# Patient Record
Sex: FEMALE | Race: WHITE | NOT HISPANIC OR LATINO | Employment: OTHER | URBAN - METROPOLITAN AREA
[De-identification: names, ages, dates, MRNs, and addresses within clinical notes are randomized per-mention and may not be internally consistent; named-entity substitution may affect disease eponyms.]

---

## 2022-01-14 NOTE — TELEPHONE ENCOUNTER
----- Message from Paulino Singh sent at 1/14/2022  2:16 PM CST -----  Contact: Patient  Patient asking for a refill to be called in      Rx- [facility]  [address]      Call back #   [phone]

## 2022-01-17 RX ORDER — DIPHENOXYLATE HYDROCHLORIDE AND ATROPINE SULFATE 2.5; .025 MG/1; MG/1
1 TABLET ORAL 4 TIMES DAILY PRN
Qty: 100 TABLET | Refills: 3 | Status: SHIPPED | OUTPATIENT
Start: 2022-01-17 | End: 2022-07-27 | Stop reason: SDUPTHER

## 2022-07-27 ENCOUNTER — OFFICE VISIT (OUTPATIENT)
Dept: GASTROENTEROLOGY | Facility: CLINIC | Age: 72
End: 2022-07-27
Payer: MEDICARE

## 2022-07-27 VITALS
HEIGHT: 66 IN | HEART RATE: 70 BPM | WEIGHT: 218.63 LBS | SYSTOLIC BLOOD PRESSURE: 132 MMHG | DIASTOLIC BLOOD PRESSURE: 85 MMHG | BODY MASS INDEX: 35.14 KG/M2

## 2022-07-27 DIAGNOSIS — Z86.010 HISTORY OF COLON POLYPS: ICD-10-CM

## 2022-07-27 DIAGNOSIS — K58.0 IRRITABLE BOWEL SYNDROME WITH DIARRHEA: Primary | ICD-10-CM

## 2022-07-27 PROCEDURE — 1159F PR MEDICATION LIST DOCUMENTED IN MEDICAL RECORD: ICD-10-PCS | Mod: CPTII,S$GLB,, | Performed by: INTERNAL MEDICINE

## 2022-07-27 PROCEDURE — 1159F MED LIST DOCD IN RCRD: CPT | Mod: CPTII,S$GLB,, | Performed by: INTERNAL MEDICINE

## 2022-07-27 PROCEDURE — 99213 OFFICE O/P EST LOW 20 MIN: CPT | Mod: S$GLB,,, | Performed by: INTERNAL MEDICINE

## 2022-07-27 PROCEDURE — 3008F BODY MASS INDEX DOCD: CPT | Mod: CPTII,S$GLB,, | Performed by: INTERNAL MEDICINE

## 2022-07-27 PROCEDURE — 3075F PR MOST RECENT SYSTOLIC BLOOD PRESS GE 130-139MM HG: ICD-10-PCS | Mod: CPTII,S$GLB,, | Performed by: INTERNAL MEDICINE

## 2022-07-27 PROCEDURE — 3079F DIAST BP 80-89 MM HG: CPT | Mod: CPTII,S$GLB,, | Performed by: INTERNAL MEDICINE

## 2022-07-27 PROCEDURE — 3008F PR BODY MASS INDEX (BMI) DOCUMENTED: ICD-10-PCS | Mod: CPTII,S$GLB,, | Performed by: INTERNAL MEDICINE

## 2022-07-27 PROCEDURE — 1101F PR PT FALLS ASSESS DOC 0-1 FALLS W/OUT INJ PAST YR: ICD-10-PCS | Mod: CPTII,S$GLB,, | Performed by: INTERNAL MEDICINE

## 2022-07-27 PROCEDURE — 3288F FALL RISK ASSESSMENT DOCD: CPT | Mod: CPTII,S$GLB,, | Performed by: INTERNAL MEDICINE

## 2022-07-27 PROCEDURE — 3288F PR FALLS RISK ASSESSMENT DOCUMENTED: ICD-10-PCS | Mod: CPTII,S$GLB,, | Performed by: INTERNAL MEDICINE

## 2022-07-27 PROCEDURE — 99213 PR OFFICE/OUTPT VISIT, EST, LEVL III, 20-29 MIN: ICD-10-PCS | Mod: S$GLB,,, | Performed by: INTERNAL MEDICINE

## 2022-07-27 PROCEDURE — 3079F PR MOST RECENT DIASTOLIC BLOOD PRESSURE 80-89 MM HG: ICD-10-PCS | Mod: CPTII,S$GLB,, | Performed by: INTERNAL MEDICINE

## 2022-07-27 PROCEDURE — 1160F PR REVIEW ALL MEDS BY PRESCRIBER/CLIN PHARMACIST DOCUMENTED: ICD-10-PCS | Mod: CPTII,S$GLB,, | Performed by: INTERNAL MEDICINE

## 2022-07-27 PROCEDURE — 1101F PT FALLS ASSESS-DOCD LE1/YR: CPT | Mod: CPTII,S$GLB,, | Performed by: INTERNAL MEDICINE

## 2022-07-27 PROCEDURE — 1160F RVW MEDS BY RX/DR IN RCRD: CPT | Mod: CPTII,S$GLB,, | Performed by: INTERNAL MEDICINE

## 2022-07-27 PROCEDURE — 3075F SYST BP GE 130 - 139MM HG: CPT | Mod: CPTII,S$GLB,, | Performed by: INTERNAL MEDICINE

## 2022-07-27 RX ORDER — ROSUVASTATIN CALCIUM 10 MG/1
10 TABLET, COATED ORAL DAILY
COMMUNITY
Start: 2022-07-01

## 2022-07-27 RX ORDER — TRAZODONE HYDROCHLORIDE 100 MG/1
100 TABLET ORAL NIGHTLY
COMMUNITY
Start: 2022-06-28

## 2022-07-27 RX ORDER — DIPHENOXYLATE HYDROCHLORIDE AND ATROPINE SULFATE 2.5; .025 MG/1; MG/1
1 TABLET ORAL 4 TIMES DAILY PRN
Qty: 100 TABLET | Refills: 3 | Status: SHIPPED | OUTPATIENT
Start: 2022-07-27 | End: 2024-01-04 | Stop reason: SDUPTHER

## 2022-07-27 RX ORDER — CLONAZEPAM 1 MG/1
1 TABLET ORAL NIGHTLY
COMMUNITY
Start: 2022-07-08

## 2022-07-27 RX ORDER — PRASTERONE 6.5 MG/1
1 INSERT VAGINAL
COMMUNITY
Start: 2022-06-28

## 2022-07-27 RX ORDER — LEVOTHYROXINE, LIOTHYRONINE 76; 18 UG/1; UG/1
120 TABLET ORAL DAILY
COMMUNITY
Start: 2022-07-01

## 2022-07-27 RX ORDER — DULOXETIN HYDROCHLORIDE 30 MG/1
30 CAPSULE, DELAYED RELEASE ORAL DAILY
COMMUNITY
Start: 2022-07-01

## 2022-07-27 RX ORDER — NEBIVOLOL 5 MG/1
5 TABLET ORAL DAILY
COMMUNITY
Start: 2022-07-01

## 2022-07-27 NOTE — PROGRESS NOTES
Clinic Note    Reason for visit:  The primary encounter diagnosis was Irritable bowel syndrome with diarrhea. A diagnosis of History of colon polyps was also pertinent to this visit.    PCP: Elysia [facility]   [address] [facility] [address] [place] [address]    HPI:  This is a 72 y.o. female who is being seen for a follow up visit. Patient with IBS-D. She reports doing much better. She has about 2 bad days a month. She states BMs at least twice a day which is improved. On bad days, may have 4-5 BMs daily. She takes Lomotil prn. Usually only if going out to eat or on a trip. She reports having lost a little weight and feels that may have helped as well.     Last colonoscopy 12/7/2020: 2 polyps: 1 TA, repeat colon in 3y    Chronic diarrhea: likely IBS-D, DBx mild IEL, Celiac serologies neg, gluten-sensitive, CBx nl, Bentyl 20mg TID PRN. Welchol 2 times a day and dietary restrictions helped. Colestipol BID helped better with PRN Bentyl. PRN Lomotil helped within 3 days. Rifaximin was cost prohibitive ($1000). Overall much improved.       Irritable bowel syndrome with diarrhea       Personal history of colon polyps: due 12/2023    Review of Systems   Constitutional: Positive for fatigue. Negative for chills, diaphoresis, fever and unexpected weight change.   HENT: Negative for mouth sores, nosebleeds, postnasal drip, sore throat, trouble swallowing and voice change.    Eyes: Positive for eye dryness. Negative for pain and discharge.   Respiratory: Negative for apnea, cough, choking, chest tightness, shortness of breath and wheezing.    Cardiovascular: Negative for chest pain, palpitations, leg swelling and claudication.   Gastrointestinal: Positive for abdominal pain and diarrhea. Negative for abdominal distention, anal bleeding, blood in stool, change in bowel habit, constipation, nausea, rectal pain, vomiting, reflux, fecal incontinence and change in bowel habit.   Genitourinary: Negative for bladder incontinence, difficulty  urinating, dysuria, flank pain, frequency and hematuria.   Musculoskeletal: Positive for joint swelling. Negative for arthralgias, back pain and joint deformity.   Integumentary:  Negative for color change, rash and wound.   Allergic/Immunologic: Negative for environmental allergies and food allergies.   Neurological: Negative for seizures, facial asymmetry, speech difficulty, weakness, headaches and memory loss.   Hematological: Negative for adenopathy. Does not bruise/bleed easily.   Psychiatric/Behavioral: Negative for agitation, behavioral problems, confusion, hallucinations and sleep disturbance.      Past Medical History:   Diagnosis Date    Diarrhea     Hyperlipidemia .    Hypertension     Insomnia     Irritable bowel syndrome with diarrhea     Obesity, Class II, BMI 35-39.9, isolated (see actual BMI)     Sleep apnea      Past Surgical History:   Procedure Laterality Date    bladder lift      CHOLECYSTECTOMY      COLONOSCOPY      HYSTERECTOMY      rectocele surgery      TOTAL KNEE ARTHROPLASTY       History reviewed. No pertinent family history.  Social History     Tobacco Use    Smoking status: Never Smoker    Smokeless tobacco: Never Used   Substance Use Topics    Alcohol use: Not Currently     Review of patient's allergies indicates:   Allergen Reactions    Opioids - morphine analogues Nausea And Vomiting        Medication List with Changes/Refills   Current Medications    CLONAZEPAM (KLONOPIN) 1 MG TABLET    Take 1 mg by mouth every evening.    DULOXETINE (CYMBALTA) 30 MG CAPSULE    Take 30 mg by mouth once daily.    INTRAROSA 6.5 MG INST    Place 1 tablet vaginally twice a week.    NEBIVOLOL (BYSTOLIC) 5 MG TAB    Take 5 mg by mouth once daily.    NP THYROID 120 MG TAB    Take 120 mg by mouth once daily.    ROSUVASTATIN (CRESTOR) 10 MG TABLET    Take 10 mg by mouth once daily.    TRAZODONE (DESYREL) 100 MG TABLET    Take 100 mg by mouth nightly.   Changed and/or Refilled Medications     "Modified Medication Previous Medication    DIPHENOXYLATE-ATROPINE 2.5-0.025 MG (LOMOTIL) 2.5-0.025 MG PER TABLET diphenoxylate-atropine 2.5-0.025 mg (LOMOTIL) 2.5-0.025 mg per tablet       Take 1 tablet by mouth 4 (four) times daily as needed for Diarrhea.    Take 1 tablet by mouth 4 (four) times daily as needed for Diarrhea.         Vital Signs:  /85 (BP Location: Right arm, Patient Position: Sitting, BP Method: Large (Automatic))   Pulse 70   Ht 5' 5.5" (1.664 m)   Wt 99.2 kg (218 lb 9.6 oz)   BMI 35.82 kg/m²         Physical Exam  Vitals reviewed.   Constitutional:       General: She is awake. She is not in acute distress.     Appearance: Normal appearance. She is well-developed. She is obese. She is not ill-appearing, toxic-appearing or diaphoretic.   HENT:      Head: Normocephalic and atraumatic.      Nose: Nose normal.      Mouth/Throat:      Mouth: Mucous membranes are moist.      Pharynx: Oropharynx is clear. No oropharyngeal exudate or posterior oropharyngeal erythema.   Eyes:      General: Lids are normal. Gaze aligned appropriately. No scleral icterus.        Right eye: No discharge.         Left eye: No discharge.      Extraocular Movements: Extraocular movements intact.      Conjunctiva/sclera: Conjunctivae normal.   Neck:      Trachea: Trachea normal.   Cardiovascular:      Rate and Rhythm: Normal rate and regular rhythm.      Pulses:           Radial pulses are 2+ on the right side and 2+ on the left side.   Pulmonary:      Effort: Pulmonary effort is normal. No respiratory distress.      Breath sounds: Normal breath sounds. No stridor. No wheezing or rhonchi.   Chest:      Chest wall: No tenderness.   Abdominal:      General: Bowel sounds are normal. There is no distension.      Palpations: Abdomen is soft. There is no fluid wave, hepatomegaly or mass.      Tenderness: There is no abdominal tenderness. There is no guarding or rebound.   Musculoskeletal:         General: No tenderness or " deformity.      Cervical back: Full passive range of motion without pain and neck supple. No tenderness.      Right lower leg: No edema.      Left lower leg: No edema.   Lymphadenopathy:      Cervical: No cervical adenopathy.   Skin:     General: Skin is warm and dry.      Capillary Refill: Capillary refill takes less than 2 seconds.      Coloration: Skin is not cyanotic, jaundiced or pale.      Findings: No rash.   Neurological:      General: No focal deficit present.      Mental Status: She is alert and oriented to person, place, and time.      Cranial Nerves: No facial asymmetry.      Motor: No tremor.   Psychiatric:         Attention and Perception: Attention normal.         Mood and Affect: Mood and affect normal.         Speech: Speech normal.         Behavior: Behavior normal. Behavior is cooperative.            All of the data above and below has been reviewed by myself and any further interpretations will be reflected in the assessment and plan.   The data includes review of external notes, and independent interpretation of lab results, procedures, x-rays, and imaging reports.      Assessment:  Irritable bowel syndrome with diarrhea    History of colon polyps    Other orders  -     diphenoxylate-atropine 2.5-0.025 mg (LOMOTIL) 2.5-0.025 mg per tablet; Take 1 tablet by mouth 4 (four) times daily as needed for Diarrhea.  Dispense: 100 tablet; Refill: 3    Chronic diarrhea: likely IBS-D, DBx mild IEL, Celiac serologies neg, gluten-sensitive, CBx nl. Doing well with Lomotil prn.     Personal history of colon polyps: due 12/2023     Recommendations:  Continue to use Lomotil as needed.    Risks, benefits, and alternatives of medical management, any associated procedures, and/or treatment discussed with the patient. Patient given opportunity to ask questions and voices understanding. Patient has elected to proceed with the recommended care modalities as discussed.    Follow up in about 1 year (around  7/27/2023).    Order summary:  No orders of the defined types were placed in this encounter.       Instructed patient to notify my office if they have not been contacted within two weeks after any procedures, submitting any samples (biopsies, blood, stool, urine, etc.) or after any imaging (X-ray, CT, MRI, etc.).     Zoila Amador MD    This document may have been created using a voice recognition transcribing system. Incorrect words or phrases may have been missed during proofreading. Please interpret accordingly or contact me for clarification.

## 2022-09-29 ENCOUNTER — HISTORICAL (OUTPATIENT)
Dept: ADMINISTRATIVE | Facility: HOSPITAL | Age: 72
End: 2022-09-29

## 2023-07-21 ENCOUNTER — TELEPHONE (OUTPATIENT)
Dept: GASTROENTEROLOGY | Facility: CLINIC | Age: 73
End: 2023-07-21
Payer: MEDICARE

## 2023-07-21 NOTE — TELEPHONE ENCOUNTER
----- Message from Damaris Garcia sent at 7/21/2023  3:21 PM CDT -----    ----- Message -----  From: Heather Felix  Sent: 7/21/2023   3:11 PM CDT  To: [facility] Staff    Type:  Needs Medical Advice    Who Called: Regina Souza  Symptoms (please be specific): -   How long has patient had these symptoms:  -  Pharmacy name and phone #:  -  Would the patient rather a call back or a response via [facility]?    Best Call Back Number: [phone]    Additional Information: pt needs  to cancel and reschedule upcoming appt ( 7/27/23) she just had knee replacement procedure please call

## 2023-07-21 NOTE — TELEPHONE ENCOUNTER
Returned pt call and she stated she just had knee replacement surgery and needs to reschedule her 1 yr f/u. I informed her it would need to be 6 mo out since she just had surgery. I rescheduled her OV for 1/4/24 with NP-KN. Pt acknowledge she understood. kenan

## 2024-01-03 NOTE — PROGRESS NOTES
Clinic Note    Reason for visit:  The primary encounter diagnosis was Irritable bowel syndrome with diarrhea. A diagnosis of History of colon polyps was also pertinent to this visit.    PCP: Elysia Manuel       HPI:  This is a 73 y.o. female here for follow up. Patient with IBS-D. Takes lomotil as needed. She was started on Cymbalta and states her IBS symptoms have improved significantly. May have to take lomotil 1-2 times a week. No abdominal pain, blood in the stool. Denies reflux, melena.    Last colonoscopy 12/7/2020: 2 polyps: 1 TA, repeat colon in 3y    12/2017 Colonoscopy with Dr. Allen: TC polyp s/p piecemeal polypectomy with ablation of residual tissue.  11/2017-Celiac negative  11/2017 Colonoscopy- DBx mild IEL w/preserved villi, CBx nl, 1 TA, 2 SSAs. Large polyp is a SSA. Needs referral for resection and Celiac serologies.      Chronic diarrhea: likely IBS-D, DBx mild IEL, Celiac serologies neg, gluten-sensitive, CBx nl, Bentyl 20mg TID PRN. Welchol 2 times a day and dietary restrictions helped. Colestipol BID helped better with PRN Bentyl. PRN Lomotil helped within 3 days. Rifaximin was cost prohibitive ($1000). Overall much improved.       Irritable bowel syndrome with diarrhea       Personal history of colon polyps: due 12/2023    Review of Systems   Constitutional:  Negative for fatigue, fever and unexpected weight change.   HENT:  Negative for mouth sores, postnasal drip, sore throat and trouble swallowing.    Eyes:  Positive for eye dryness. Negative for pain and discharge.   Respiratory:  Positive for apnea. Negative for cough, choking, chest tightness, shortness of breath and wheezing.    Cardiovascular:  Negative for chest pain, palpitations and leg swelling.   Gastrointestinal:  Positive for abdominal distention, abdominal pain and diarrhea. Negative for anal bleeding, blood in stool, change in bowel habit, constipation, nausea, rectal pain, vomiting, reflux and fecal incontinence.    Genitourinary:  Negative for bladder incontinence, dysuria and hematuria.   Musculoskeletal:  Positive for joint swelling. Negative for arthralgias and back pain.   Integumentary:  Negative for color change and rash.   Allergic/Immunologic: Negative for environmental allergies and food allergies.   Neurological:  Negative for seizures and headaches.   Hematological:  Negative for adenopathy. Does not bruise/bleed easily.        Past Medical History:   Diagnosis Date    Colon polyp     Hyperlipidemia .    Hypertension     Insomnia     Irritable bowel syndrome with diarrhea     Irritable bowel syndrome with diarrhea     Obesity, Class II, BMI 35-39.9, isolated (see actual BMI)     Sleep apnea     12 setting     Past Surgical History:   Procedure Laterality Date    bladder lift      CHOLECYSTECTOMY      COLONOSCOPY      HYSTERECTOMY      rectocele surgery      TOTAL KNEE ARTHROPLASTY Bilateral      Family History   Problem Relation Age of Onset    Diverticulitis Grandchild      Social History     Tobacco Use    Smoking status: Never    Smokeless tobacco: Never   Substance Use Topics    Alcohol use: Not Currently    Drug use: Never     Review of patient's allergies indicates:   Allergen Reactions    Opioids - morphine analogues Nausea And Vomiting      Medication List with Changes/Refills   New Medications    SOD SULF-POT CHLORIDE-MAG SULF (SUTAB) 1.479-0.188- 0.225 GRAM TABLET    Take according to package instructions with indicated amount of water. No breakfast day before test. May substitute with Suprep, Clenpiq, Plenvu, Moviprep or GoLytely based on Rx plan and patient preference.   Current Medications    CLONAZEPAM (KLONOPIN) 1 MG TABLET    Take 1 mg by mouth every evening.    DULOXETINE (CYMBALTA) 30 MG CAPSULE    Take 30 mg by mouth once daily.    HYDROCHLOROTHIAZIDE (HYDRODIURIL) 12.5 MG TAB    TAKE ONE TABLET BY MOUTH EVERY DAY AS NEEDED FOR FLUID OR EDEMA    INTRAROSA 6.5 MG INST    Place 1 tablet vaginally  "twice a week.    MELOXICAM (MOBIC) 7.5 MG TABLET    Take 7.5 mg by mouth 2 (two) times daily as needed.    NEBIVOLOL (BYSTOLIC) 5 MG TAB    Take 5 mg by mouth once daily.    NP THYROID 120 MG TAB    Take 120 mg by mouth once daily.    ROSUVASTATIN (CRESTOR) 10 MG TABLET    Take 10 mg by mouth once daily.    TRAZODONE (DESYREL) 100 MG TABLET    Take 100 mg by mouth nightly.   Changed and/or Refilled Medications    Modified Medication Previous Medication    DIPHENOXYLATE-ATROPINE 2.5-0.025 MG (LOMOTIL) 2.5-0.025 MG PER TABLET diphenoxylate-atropine 2.5-0.025 mg (LOMOTIL) 2.5-0.025 mg per tablet       Take 1 tablet by mouth 4 (four) times daily as needed for Diarrhea.    Take 1 tablet by mouth 4 (four) times daily as needed for Diarrhea.         Vital Signs:  BP (!) 146/69   Pulse 71   Ht 5' 5.5" (1.664 m)   Wt 100 kg (220 lb 6.4 oz)   SpO2 95%   BMI 36.12 kg/m²        Physical Exam  Vitals reviewed.   Constitutional:       General: She is awake. She is not in acute distress.     Appearance: Normal appearance. She is well-developed. She is not ill-appearing, toxic-appearing or diaphoretic.   HENT:      Head: Normocephalic and atraumatic.      Nose: Nose normal.      Mouth/Throat:      Mouth: Mucous membranes are moist.      Pharynx: Oropharynx is clear. No oropharyngeal exudate or posterior oropharyngeal erythema.   Eyes:      General: Lids are normal. Gaze aligned appropriately. No scleral icterus.        Right eye: No discharge.         Left eye: No discharge.      Conjunctiva/sclera: Conjunctivae normal.   Neck:      Trachea: Trachea normal.   Cardiovascular:      Rate and Rhythm: Normal rate and regular rhythm.      Pulses:           Radial pulses are 2+ on the right side and 2+ on the left side.   Pulmonary:      Effort: Pulmonary effort is normal. No respiratory distress.      Breath sounds: No stridor. No wheezing.   Chest:      Chest wall: No tenderness.   Abdominal:      General: Bowel sounds are normal. " There is no distension.      Palpations: Abdomen is soft. There is no fluid wave, hepatomegaly or mass.      Tenderness: There is no abdominal tenderness. There is no guarding or rebound.   Musculoskeletal:         General: No tenderness or deformity.      Cervical back: Full passive range of motion without pain and neck supple. No tenderness.      Right lower leg: No edema.      Left lower leg: No edema.   Lymphadenopathy:      Cervical: No cervical adenopathy.   Skin:     General: Skin is warm and dry.      Capillary Refill: Capillary refill takes less than 2 seconds.      Coloration: Skin is not cyanotic, jaundiced or pale.   Neurological:      General: No focal deficit present.      Mental Status: She is alert and oriented to person, place, and time.      Motor: No tremor.   Psychiatric:         Attention and Perception: Attention normal.         Mood and Affect: Mood and affect normal.         Speech: Speech normal.         Behavior: Behavior normal. Behavior is cooperative.            All of the data above and below has been reviewed by myself and any further interpretations will be reflected in the assessment and plan.   The data includes review of external notes, and independent interpretation of lab results, procedures, x-rays, and imaging reports.      Assessment:  Irritable bowel syndrome with diarrhea  -     diphenoxylate-atropine 2.5-0.025 mg (LOMOTIL) 2.5-0.025 mg per tablet; Take 1 tablet by mouth 4 (four) times daily as needed for Diarrhea.  Dispense: 100 tablet; Refill: 3    History of colon polyps  -     Ambulatory Referral to External Surgery  -     sod sulf-pot chloride-mag sulf (SUTAB) 1.479-0.188- 0.225 gram tablet; Take according to package instructions with indicated amount of water. No breakfast day before test. May substitute with Suprep, Clenpiq, Plenvu, Moviprep or GoLytely based on Rx plan and patient preference.  Dispense: 24 tablet; Refill: 0         Chronic diarrhea: likely IBS-D, DBx  mild IEL, Celiac serologies neg, gluten-sensitive, CBx nl. Doing well with Lomotil prn.     Personal history of colon polyps: due for colonoscopy.    Recommendations:    Schedule colonoscopy with Dr. Amador.  Continue with lomotil as needed.      Risks, benefits, and alternatives of medical management, any associated procedures, and/or treatment discussed with the patient. Patient given opportunity to ask questions and voices understanding. Patient has elected to proceed with the recommended care modalities as discussed.    Follow up in about 1 year (around 1/4/2025).    Order summary:  Orders Placed This Encounter   Procedures    Ambulatory Referral to External Surgery        Instructed patient to notify my office if they have not been contacted within two weeks after any procedures, submitting any samples (biopsies, blood, stool, urine, etc.) or after any imaging (X-ray, CT, MRI, etc.).      Nannette Bellamy NP    This document may have been created using a voice recognition transcribing system. Incorrect words or phrases may have been missed during proofreading. Please interpret accordingly or contact me for clarification.

## 2024-01-04 ENCOUNTER — OFFICE VISIT (OUTPATIENT)
Dept: GASTROENTEROLOGY | Facility: CLINIC | Age: 74
End: 2024-01-04
Payer: MEDICARE

## 2024-01-04 VITALS
OXYGEN SATURATION: 95 % | SYSTOLIC BLOOD PRESSURE: 146 MMHG | WEIGHT: 220.38 LBS | DIASTOLIC BLOOD PRESSURE: 69 MMHG | HEART RATE: 71 BPM | BODY MASS INDEX: 35.42 KG/M2 | HEIGHT: 66 IN

## 2024-01-04 DIAGNOSIS — K58.0 IRRITABLE BOWEL SYNDROME WITH DIARRHEA: Primary | ICD-10-CM

## 2024-01-04 DIAGNOSIS — Z86.010 HISTORY OF COLON POLYPS: ICD-10-CM

## 2024-01-04 PROCEDURE — 3077F SYST BP >= 140 MM HG: CPT | Mod: CPTII,S$GLB,, | Performed by: NURSE PRACTITIONER

## 2024-01-04 PROCEDURE — 99214 OFFICE O/P EST MOD 30 MIN: CPT | Mod: S$GLB,,, | Performed by: NURSE PRACTITIONER

## 2024-01-04 PROCEDURE — 3288F FALL RISK ASSESSMENT DOCD: CPT | Mod: CPTII,S$GLB,, | Performed by: NURSE PRACTITIONER

## 2024-01-04 PROCEDURE — 1101F PT FALLS ASSESS-DOCD LE1/YR: CPT | Mod: CPTII,S$GLB,, | Performed by: NURSE PRACTITIONER

## 2024-01-04 PROCEDURE — 1160F RVW MEDS BY RX/DR IN RCRD: CPT | Mod: CPTII,S$GLB,, | Performed by: NURSE PRACTITIONER

## 2024-01-04 PROCEDURE — 3078F DIAST BP <80 MM HG: CPT | Mod: CPTII,S$GLB,, | Performed by: NURSE PRACTITIONER

## 2024-01-04 PROCEDURE — 1159F MED LIST DOCD IN RCRD: CPT | Mod: CPTII,S$GLB,, | Performed by: NURSE PRACTITIONER

## 2024-01-04 PROCEDURE — 3008F BODY MASS INDEX DOCD: CPT | Mod: CPTII,S$GLB,, | Performed by: NURSE PRACTITIONER

## 2024-01-04 RX ORDER — SOD SULF/POT CHLORIDE/MAG SULF 1.479 G
TABLET ORAL
Qty: 24 TABLET | Refills: 0 | Status: SHIPPED | OUTPATIENT
Start: 2024-01-04

## 2024-01-04 RX ORDER — MELOXICAM 7.5 MG/1
7.5 TABLET ORAL 2 TIMES DAILY PRN
COMMUNITY
Start: 2023-08-30

## 2024-01-04 RX ORDER — DIPHENOXYLATE HYDROCHLORIDE AND ATROPINE SULFATE 2.5; .025 MG/1; MG/1
1 TABLET ORAL 4 TIMES DAILY PRN
Qty: 100 TABLET | Refills: 3 | Status: SHIPPED | OUTPATIENT
Start: 2024-01-04

## 2024-01-04 RX ORDER — HYDROCHLOROTHIAZIDE 12.5 MG/1
TABLET ORAL
COMMUNITY
Start: 2023-09-20

## 2024-01-04 NOTE — PATIENT INSTRUCTIONS
Schedule colonoscopy with Dr. Amador.  Continue with lomotil as needed.    Please notify my office if you have not been contacted within two weeks after any procedures, submitting any samples (biopsies, blood, stool, urine, etc.) or after any imaging (X-ray, CT, MRI, etc.).

## 2024-05-03 ENCOUNTER — TELEPHONE (OUTPATIENT)
Dept: GASTROENTEROLOGY | Facility: CLINIC | Age: 74
End: 2024-05-03
Payer: MEDICARE

## 2024-05-03 VITALS — BODY MASS INDEX: 35.36 KG/M2 | HEIGHT: 66 IN | WEIGHT: 220 LBS

## 2024-05-03 DIAGNOSIS — Z86.010 HISTORY OF COLON POLYPS: Primary | ICD-10-CM

## 2024-05-03 NOTE — TELEPHONE ENCOUNTER
"[facility]  [address] [place] [address]  Phone: [phone]  Fax: [phone]    History & Physical         Provider: Dr. Zoila Amador    Patient Name: Regina ROSEN (age):1950  73 y.o.           Gender: female   Phone: [phone]     Referring Physician: Elysia Manuel     Vital Signs:   Height - 5' 5.5"  Weight - 220 lb  BMI -  36.05    Plan: Colonoscopy  @ [facility]    Encounter Diagnosis   Name Primary?    History of colon polyps Yes           History:      Past Medical History:   Diagnosis Date    Colon polyp     Hyperlipidemia .    Hypertension     Insomnia     Irritable bowel syndrome with diarrhea     Irritable bowel syndrome with diarrhea     Obesity, Class II, BMI 35-39.9, isolated (see actual BMI)     Sleep apnea     12 setting      Past Surgical History:   Procedure Laterality Date    bladder lift      CHOLECYSTECTOMY      COLONOSCOPY      HYSTERECTOMY      rectocele surgery      TOTAL KNEE ARTHROPLASTY Bilateral       Medication List with Changes/Refills   Current Medications    CLONAZEPAM (KLONOPIN) 1 MG TABLET    Take 1 mg by mouth every evening.    DIPHENOXYLATE-ATROPINE 2.5-0.025 MG (LOMOTIL) 2.5-0.025 MG PER TABLET    Take 1 tablet by mouth 4 (four) times daily as needed for Diarrhea.    DULOXETINE (CYMBALTA) 30 MG CAPSULE    Take 30 mg by mouth once daily.    HYDROCHLOROTHIAZIDE (HYDRODIURIL) 12.5 MG TAB    TAKE ONE TABLET BY MOUTH EVERY DAY AS NEEDED FOR FLUID OR EDEMA    INTRAROSA 6.5 MG INST    Place 1 tablet vaginally twice a week.    MELOXICAM (MOBIC) 7.5 MG TABLET    Take 7.5 mg by mouth 2 (two) times daily as needed.    NEBIVOLOL (BYSTOLIC) 5 MG TAB    Take 5 mg by mouth once daily.    NP THYROID 120 MG TAB    Take 120 mg by mouth once daily.    ROSUVASTATIN (CRESTOR) 10 MG TABLET    Take 10 mg by mouth once daily.    SOD SULF-POT CHLORIDE-MAG SULF (SUTAB) " 1.479-0.188- 0.225 GRAM TABLET    Take according to package instructions with indicated amount of water. No breakfast day before test. May substitute with Suprep, Clenpiq, Plenvu, Moviprep or GoLytely based on Rx plan and patient preference.    TRAZODONE (DESYREL) 100 MG TABLET    Take 100 mg by mouth nightly.      Review of patient's allergies indicates:   Allergen Reactions    Opioids - morphine analogues Nausea And Vomiting      Family History   Problem Relation Name Age of Onset    Diverticulitis Grandchild        Social History     Tobacco Use    Smoking status: Never    Smokeless tobacco: Never   Substance Use Topics    Alcohol use: Not Currently    Drug use: Never        Physical Examination:     General Appearance:___________________________  HEENT: _____________________________________  Abdomen:____________________________________  Heart:________________________________________  Lungs:_______________________________________  Extremities:___________________________________  Skin:_________________________________________  Endocrine:____________________________________  Genitourinary:_________________________________  Neurological:__________________________________      Patient has been evaluated immediately prior to sedation and is medically cleared for endoscopy with IVCS as an ASA class: ______      Physician Signature: _________________________       Date: ________  Time: ________

## 2024-05-03 NOTE — TELEPHONE ENCOUNTER
S/w pt and told her that I was calling as a courtesy regarding upcoming COLON with [facility] on 5/13/24, Tues and wanted to verify that she had her paper prep instructions and meds. Pt stated she has both.  I also mentioned that [facility] will call the day before (MON) with the arrival time, GI lab is located on the third floor, and to pre-register next week. Pt acknowledge she understood. kenan

## 2024-05-13 ENCOUNTER — TELEPHONE (OUTPATIENT)
Dept: GASTROENTEROLOGY | Facility: CLINIC | Age: 74
End: 2024-05-13
Payer: MEDICARE

## 2024-05-13 NOTE — TELEPHONE ENCOUNTER
Returned patients call. m to speak to patient. 5/13/24 LRA     ----- Message from Piper Fan sent at 5/13/2024  1:07 PM CDT -----  Contact: self  Type:  Patient Returning Call    Who Called:Regina Souza  Who Left Message for Patient:unsure  Does the patient know what this is regarding?:appt/colonscopy arrival time  Would the patient rather a call back or a response via [facility]?   Best Call Back Number:[phone]  Additional Information: n/a

## 2024-05-13 NOTE — TELEPHONE ENCOUNTER
Spoke to pt. She is to arrive at 7:30 am @ [facility]. She wanted to make sure she had the correct time. I let her know that the hospital doesn't give us arrival times and to go with what they told her. -KG

## 2024-05-13 NOTE — TELEPHONE ENCOUNTER
----- Message from Piper Fan sent at 5/13/2024  2:25 PM CDT -----  Contact: self  Type:  Patient Returning Call    Who Called:Regina Souza  Who Left Message for Patient:jackie  Does the patient know what this is regarding?:appt  Would the patient rather a call back or a response via [facility]?   Best Call Back Number:[phone]  Additional Information: n/a

## 2024-05-14 ENCOUNTER — OUTSIDE PLACE OF SERVICE (OUTPATIENT)
Dept: GASTROENTEROLOGY | Facility: CLINIC | Age: 74
End: 2024-05-14

## 2024-05-14 LAB — CRC RECOMMENDATION EXT: NORMAL

## 2024-05-14 PROCEDURE — G0105 COLORECTAL SCRN; HI RISK IND: HCPCS | Mod: ,,, | Performed by: INTERNAL MEDICINE

## 2024-06-10 ENCOUNTER — DOCUMENTATION ONLY (OUTPATIENT)
Dept: GASTROENTEROLOGY | Facility: CLINIC | Age: 74
End: 2024-06-10
Payer: MEDICARE

## 2025-01-06 NOTE — PROGRESS NOTES
Clinic Note    Reason for visit:  The primary encounter diagnosis was Irritable bowel syndrome with diarrhea. Diagnoses of Bloating and History of colon polyps were also pertinent to this visit.    PCP: Elysia Manuel       HPI:  This is a 74 y.o. female here for follow up. Patient with IBS-D. Takes Lomotil as needed. She states she has been feeling well this past year. She had 2 flares of severe abdominal pain, diarrhea twice this year. Her main issue bloating. Her bloating does improve when she goes gluten free. Denies blood in stool.      Colonoscopy 5/14/2024 diverticulosis, small amount of fecal contamination- repeat in 3 years with extended prep.  Colonoscopy 12/7/2020: 2 polyps: 1 TA, repeat colon in 3y  12/2017 Colonoscopy with Dr. Allen: TC polyp s/p piecemeal polypectomy with ablation of residual tissue.  11/2017-Celiac negative  11/2017 Colonoscopy- DBx mild IEL w/preserved villi, CBx nl, 1 TA, 2 SSAs. Large polyp is a SSA. Needs referral for resection and Celiac serologies.      Chronic diarrhea: likely IBS-D, DBx mild IEL, Celiac serologies neg, gluten-sensitive, CBx nl, Bentyl 20mg TID PRN. Welchol 2 times a day and dietary restrictions helped. Colestipol BID helped better with PRN Bentyl. PRN Lomotil helped within 3 days. Rifaximin was cost prohibitive ($1000). Overall much improved.       Irritable bowel syndrome with diarrhea       Personal history of colon polyps:     Review of Systems   Constitutional:  Negative for fatigue, fever and unexpected weight change.   HENT:  Negative for mouth sores, postnasal drip, sore throat and trouble swallowing.    Eyes:  Negative for pain, discharge and eye dryness.   Respiratory:  Positive for apnea. Negative for cough, choking, chest tightness, shortness of breath and wheezing.    Cardiovascular:  Negative for chest pain, palpitations and leg swelling.   Gastrointestinal:  Positive for abdominal distention, abdominal pain and diarrhea. Negative for anal  bleeding, blood in stool, change in bowel habit, constipation, nausea, rectal pain, vomiting, reflux and fecal incontinence.   Genitourinary:  Negative for bladder incontinence, dysuria and hematuria.   Musculoskeletal:  Positive for joint swelling. Negative for arthralgias and back pain.   Integumentary:  Negative for color change and rash.   Allergic/Immunologic: Negative for environmental allergies and food allergies.   Neurological:  Negative for seizures and headaches.   Hematological:  Negative for adenopathy. Does not bruise/bleed easily.        Past Medical History:   Diagnosis Date    History of colonic polyps     History of total hysterectomy     Hyperlipidemia .    Hypertension     Hypothyroid     Insomnia     Irritable bowel syndrome with diarrhea     Obesity, Class II, BMI 35-39.9, isolated (see actual BMI)     Sleep apnea     12 setting     Past Surgical History:   Procedure Laterality Date    bladder lift      CHOLECYSTECTOMY      COLONOSCOPY      HYSTERECTOMY      rectocele surgery      TOTAL KNEE ARTHROPLASTY Bilateral      Family History   Problem Relation Name Age of Onset    Breast cancer Sister      Breast cancer Sister      Colon cancer Maternal Grandfather      Inflammatory bowel disease Grandchild      Diverticulitis Grandchild      Breast cancer Sister Sister     Crohn's disease Neg Hx      Esophageal cancer Neg Hx      Liver cancer Neg Hx      Rectal cancer Neg Hx      Stomach cancer Neg Hx       Social History     Tobacco Use    Smoking status: Never    Smokeless tobacco: Never   Substance Use Topics    Alcohol use: Never    Drug use: Never     Review of patient's allergies indicates:   Allergen Reactions    Opioids - morphine analogues Nausea And Vomiting      Medication List with Changes/Refills   Current Medications    CLONAZEPAM (KLONOPIN) 1 MG TABLET    Take 1 mg by mouth every evening.    DIPHENOXYLATE-ATROPINE 2.5-0.025 MG (LOMOTIL) 2.5-0.025 MG PER TABLET    Take 1 tablet by mouth  "4 (four) times daily as needed for Diarrhea.    DULOXETINE (CYMBALTA) 30 MG CAPSULE    Take 30 mg by mouth 2 (two) times daily.    HYDROCHLOROTHIAZIDE (HYDRODIURIL) 12.5 MG TAB    Takes as needed    INTRAROSA 6.5 MG INST    Place 1 tablet vaginally twice a week.    NEBIVOLOL (BYSTOLIC) 5 MG TAB    Take 5 mg by mouth once daily.    NP THYROID 120 MG TAB    Take 120 mg by mouth once daily.   Discontinued Medications    MELOXICAM (MOBIC) 7.5 MG TABLET    Take 7.5 mg by mouth 2 (two) times daily as needed.    ROSUVASTATIN (CRESTOR) 10 MG TABLET    Take 10 mg by mouth once daily.    SOD SULF-POT CHLORIDE-MAG SULF (SUTAB) 1.479-0.188- 0.225 GRAM TABLET    Take according to package instructions with indicated amount of water. No breakfast day before test. May substitute with Suprep, Clenpiq, Plenvu, Moviprep or GoLytely based on Rx plan and patient preference.    TRAZODONE (DESYREL) 100 MG TABLET    Take 100 mg by mouth nightly.         Vital Signs:  BP (!) 142/86 (BP Location: Right arm, Patient Position: Sitting)   Pulse 65   Ht 5' 5.5" (1.664 m)   Wt 104.5 kg (230 lb 6.4 oz)   SpO2 98%   BMI 37.76 kg/m²        Physical Exam  Vitals reviewed.   Constitutional:       General: She is awake. She is not in acute distress.     Appearance: Normal appearance. She is well-developed. She is not ill-appearing, toxic-appearing or diaphoretic.   HENT:      Head: Normocephalic and atraumatic.      Nose: Nose normal.      Mouth/Throat:      Mouth: Mucous membranes are moist.      Pharynx: Oropharynx is clear. No oropharyngeal exudate or posterior oropharyngeal erythema.   Eyes:      General: Lids are normal. Gaze aligned appropriately. No scleral icterus.        Right eye: No discharge.         Left eye: No discharge.      Conjunctiva/sclera: Conjunctivae normal.   Neck:      Trachea: Trachea normal.   Cardiovascular:      Rate and Rhythm: Normal rate and regular rhythm.      Pulses:           Radial pulses are 2+ on the right " side and 2+ on the left side.   Pulmonary:      Effort: Pulmonary effort is normal. No respiratory distress.      Breath sounds: No stridor. No wheezing.   Chest:      Chest wall: No tenderness.   Abdominal:      General: Bowel sounds are normal. There is no distension.      Palpations: Abdomen is soft. There is no fluid wave, hepatomegaly or mass.      Tenderness: There is no abdominal tenderness. There is no guarding or rebound.   Musculoskeletal:         General: No tenderness or deformity.      Cervical back: No tenderness.      Right lower leg: No edema.      Left lower leg: No edema.   Lymphadenopathy:      Cervical: No cervical adenopathy.   Skin:     General: Skin is warm and dry.      Capillary Refill: Capillary refill takes less than 2 seconds.      Coloration: Skin is not cyanotic, jaundiced or pale.   Neurological:      General: No focal deficit present.      Mental Status: She is alert and oriented to person, place, and time.      Motor: No tremor.   Psychiatric:         Attention and Perception: Attention normal.         Mood and Affect: Mood and affect normal.         Speech: Speech normal.         Behavior: Behavior normal. Behavior is cooperative.            All of the data above and below has been reviewed by myself and any further interpretations will be reflected in the assessment and plan.   The data includes review of external notes, and independent interpretation of lab results, procedures, x-rays, and imaging reports.      Assessment:  Irritable bowel syndrome with diarrhea    Bloating    History of colon polyps         Chronic diarrhea: likely IBS-D, DBx mild IEL, Celiac serologies neg, gluten-sensitive, CBx nl. Doing well with Lomotil prn.   Bloating. Will complete sucrase breath test to r/u CSID    Personal history of colon polyps: due 5/2027 with aggressive prep    Recommendations:    Complete sucrase breath test.    Risks, benefits, and alternatives of medical management, any associated  procedures, and/or treatment discussed with the patient. Patient given opportunity to ask questions and voices understanding. Patient has elected to proceed with the recommended care modalities as discussed.    Follow up in about 1 year (around 1/7/2026).    Order summary:  No orders of the defined types were placed in this encounter.       Instructed patient to notify my office if they have not been contacted within two weeks after any procedures, submitting any samples (biopsies, blood, stool, urine, etc.) or after any imaging (X-ray, CT, MRI, etc.).      Zoila Amador MD    This document may have been created using a voice recognition transcribing system. Incorrect words or phrases may have been missed during proofreading. Please interpret accordingly or contact me for clarification.

## 2025-01-07 ENCOUNTER — OFFICE VISIT (OUTPATIENT)
Dept: GASTROENTEROLOGY | Facility: CLINIC | Age: 75
End: 2025-01-07
Payer: MEDICARE

## 2025-01-07 ENCOUNTER — TELEPHONE (OUTPATIENT)
Dept: GASTROENTEROLOGY | Facility: CLINIC | Age: 75
End: 2025-01-07

## 2025-01-07 VITALS
SYSTOLIC BLOOD PRESSURE: 142 MMHG | HEIGHT: 66 IN | DIASTOLIC BLOOD PRESSURE: 86 MMHG | WEIGHT: 230.38 LBS | OXYGEN SATURATION: 98 % | HEART RATE: 65 BPM | BODY MASS INDEX: 37.03 KG/M2

## 2025-01-07 DIAGNOSIS — R14.0 BLOATING: ICD-10-CM

## 2025-01-07 DIAGNOSIS — Z86.0100 HISTORY OF COLON POLYPS: ICD-10-CM

## 2025-01-07 DIAGNOSIS — K58.0 IRRITABLE BOWEL SYNDROME WITH DIARRHEA: Primary | ICD-10-CM

## 2025-01-07 PROCEDURE — 1101F PT FALLS ASSESS-DOCD LE1/YR: CPT | Mod: CPTII,,, | Performed by: INTERNAL MEDICINE

## 2025-01-07 PROCEDURE — 1159F MED LIST DOCD IN RCRD: CPT | Mod: CPTII,,, | Performed by: INTERNAL MEDICINE

## 2025-01-07 PROCEDURE — 3008F BODY MASS INDEX DOCD: CPT | Mod: CPTII,,, | Performed by: INTERNAL MEDICINE

## 2025-01-07 PROCEDURE — 3077F SYST BP >= 140 MM HG: CPT | Mod: CPTII,,, | Performed by: INTERNAL MEDICINE

## 2025-01-07 PROCEDURE — 3288F FALL RISK ASSESSMENT DOCD: CPT | Mod: CPTII,,, | Performed by: INTERNAL MEDICINE

## 2025-01-07 PROCEDURE — 1160F RVW MEDS BY RX/DR IN RCRD: CPT | Mod: CPTII,,, | Performed by: INTERNAL MEDICINE

## 2025-01-07 PROCEDURE — 3079F DIAST BP 80-89 MM HG: CPT | Mod: CPTII,,, | Performed by: INTERNAL MEDICINE

## 2025-01-07 PROCEDURE — 99213 OFFICE O/P EST LOW 20 MIN: CPT | Mod: S$PBB,,, | Performed by: INTERNAL MEDICINE

## 2025-01-07 NOTE — TELEPHONE ENCOUNTER
Patient was given breath test and it was reviewed with patient. Patient was given AVS with apt details. 1/7/25 lra

## 2025-01-07 NOTE — PATIENT INSTRUCTIONS
Complete sucrase breath test.    Please notify my office if you have not been contacted within two weeks after any procedures, submitting any samples (biopsies, blood, stool, urine, etc.) or after any imaging (X-ray, CT, MRI, etc.).

## 2025-04-03 ENCOUNTER — HOSPITAL ENCOUNTER (OUTPATIENT)
Dept: RADIOLOGY | Facility: HOSPITAL | Age: 75
Discharge: HOME | End: 2025-04-03
Attending: NURSE PRACTITIONER
Payer: MEDICARE

## 2025-04-03 DIAGNOSIS — E04.1 THYROID NODULE: ICD-10-CM

## 2025-04-03 PROCEDURE — 76536 US EXAM OF HEAD AND NECK: CPT | Mod: TC

## 2025-04-17 ENCOUNTER — TELEPHONE (OUTPATIENT)
Dept: GASTROENTEROLOGY | Facility: CLINIC | Age: 75
End: 2025-04-17
Payer: MEDICARE

## 2025-04-17 NOTE — TELEPHONE ENCOUNTER
----- Message from Fabiana sent at 4/17/2025 12:20 PM CDT -----  Contact: EDIN RICKS [[id number]]  ...Type:  Patient Requesting Appointment Who Called: EDIN RICKS [[id number]]Symptoms?: stomach cramps, boating, and diarrhea When they would like to be seen?  soonWould the patient rather a call back or a response via [facility]?  [facility] Call Back Number: [phone] (home) Additional Information:

## 2025-04-17 NOTE — TELEPHONE ENCOUNTER
Spoke with patient, scheduled w/ NP Nannette 6/3/25 @ 9am    Patient feels like her Lomotil (taking 1 QID PRN)  and Bentyl (1 TID PRN) aren't working for past few days with increase in cramping and diarrhea.    Patient would like to be added to cancellation list for an earlier availability. In the meantime what can she do for symptoms?

## 2025-04-25 NOTE — TELEPHONE ENCOUNTER
If still with issues then okay to offer her OV with me 4/30/2025 at 8 AM. If better, then okay to keep OV with Nannette in June.   MLC

## 2025-04-30 ENCOUNTER — OFFICE VISIT (OUTPATIENT)
Dept: GASTROENTEROLOGY | Facility: CLINIC | Age: 75
End: 2025-04-30
Payer: MEDICARE

## 2025-04-30 ENCOUNTER — TELEPHONE (OUTPATIENT)
Dept: GASTROENTEROLOGY | Facility: CLINIC | Age: 75
End: 2025-04-30

## 2025-04-30 VITALS
HEIGHT: 66 IN | DIASTOLIC BLOOD PRESSURE: 82 MMHG | BODY MASS INDEX: 37.16 KG/M2 | SYSTOLIC BLOOD PRESSURE: 153 MMHG | HEART RATE: 63 BPM | OXYGEN SATURATION: 96 % | WEIGHT: 231.19 LBS

## 2025-04-30 DIAGNOSIS — R10.9 ABDOMINAL CRAMPING: ICD-10-CM

## 2025-04-30 DIAGNOSIS — R19.7 DIARRHEA, UNSPECIFIED TYPE: ICD-10-CM

## 2025-04-30 DIAGNOSIS — R14.0 BLOATING: ICD-10-CM

## 2025-04-30 DIAGNOSIS — Z86.0100 HISTORY OF COLON POLYPS: ICD-10-CM

## 2025-04-30 DIAGNOSIS — K29.80 DUODENITIS DETERMINED BY BIOPSY: ICD-10-CM

## 2025-04-30 DIAGNOSIS — K58.0 IRRITABLE BOWEL SYNDROME WITH DIARRHEA: Primary | ICD-10-CM

## 2025-04-30 DIAGNOSIS — R70.0 ESR RAISED: ICD-10-CM

## 2025-04-30 PROCEDURE — 3077F SYST BP >= 140 MM HG: CPT | Mod: CPTII,,,

## 2025-04-30 PROCEDURE — 1159F MED LIST DOCD IN RCRD: CPT | Mod: CPTII,,,

## 2025-04-30 PROCEDURE — 99214 OFFICE O/P EST MOD 30 MIN: CPT | Mod: S$PBB,,,

## 2025-04-30 PROCEDURE — 3079F DIAST BP 80-89 MM HG: CPT | Mod: CPTII,,,

## 2025-04-30 PROCEDURE — 3008F BODY MASS INDEX DOCD: CPT | Mod: CPTII,,,

## 2025-04-30 PROCEDURE — 1160F RVW MEDS BY RX/DR IN RCRD: CPT | Mod: CPTII,,,

## 2025-04-30 RX ORDER — DICYCLOMINE HYDROCHLORIDE 10 MG/1
10 CAPSULE ORAL
COMMUNITY

## 2025-04-30 RX ORDER — TRAZODONE HYDROCHLORIDE 100 MG/1
TABLET ORAL
COMMUNITY
Start: 2025-04-29

## 2025-04-30 NOTE — TELEPHONE ENCOUNTER
Patient was given lab and stool orders. Ct was faxed to central scheduling. Patient was given AVS with apt details. 4/30/25 LRA

## 2025-04-30 NOTE — PROGRESS NOTES
Clinic Note    Reason for visit:  The primary encounter diagnosis was Irritable bowel syndrome with diarrhea. Diagnoses of Diarrhea, unspecified type, Bloating, Abdominal cramping, Duodenitis determined by biopsy, ESR raised, and History of colon polyps were also pertinent to this visit.    PCP: Elysia Manuel       HPI:  This is a 74 y.o. female who is established. She has IBS-D but for at least the last 2 weeks she has had diarrhea and abdominal pain/cramping that has not responded to Lomotil and dicyclomine, which usually works well for her flare ups. She has a lot of bloating as well. She also reports a lot of fatigue with this episode. She finally has started to feel somewhat better. No blood in stool. She may have up to 7-9 BMs during a flare. At baseline, she has a BM at least twice a day. Not always loose. Usually after eating. She had some amoxicillin at home 500 mg, and she took 1 tablet daily x 1 day then went to 2 tablets x 2 days. Started to feel better after that. She may have serious flare up twice a year (will faint due to pain). Very seldom takes NSAIDs. Does not take reflux meds.  She does notice an improvement when she avoids gluten.       She was previously taking Colestipol 1 gram BID.   Xifaxan was cost prohibitive previously.     4/1/2025: CMP wnl, Cr 0.71, TSH/FT3/T4 wnl, ESR 34H  11/2017-Celiac serologies negative  2017 Giardia neg    Colonoscopy 5/14/2024: pancolonic diverticulosis, small amount of fecal contamination. Normal TI and colonic mucosa otherwise. (She took Sutab for this procedure) Repeat in 3 years with extended prep.    Colonoscopy 12/7/2020: Moderate diverticulosis of descending and sigmoid colon. IH/EH. Tattoo in TC w/o residual lesions. 2 polyps: 1 TA, repeat colon in 3y  Colonoscopy 6/21/2018: 1 TA, 1 hyperp, polypectomy site looked well. Repeat colon in 2y.   12/2017 Colonoscopy with Dr. Allen: flat proximal TC polyp s/p piecemeal polypectomy with ablation of residual  tissue. Repeat colon in 6 mo.  EGD/Colonoscopy 11/2/2017: Sliding medium HH, DBx mild IEL w/preserved villi, CBx nl, 1 TA, 2 SSAs. Large polyp is a SSA in proximal transverse. Needs referral for resection and Celiac serologies.        Chronic diarrhea: likely IBS-D, DBx mild IEL, Celiac serologies neg, gluten-sensitive, CBx nl, Bentyl 20mg TID PRN. Welchol 2 times a day and dietary restrictions helped. Colestipol BID helped better with PRN Bentyl. PRN Lomotil helped within 3 days. Rifaximin was cost prohibitive ($1000). Overall much improved.       Irritable bowel syndrome with diarrhea       Personal history of colon polyps:     Review of Systems   Constitutional:  Positive for fatigue. Negative for fever and unexpected weight change.   HENT:  Negative for mouth sores, postnasal drip, sore throat and trouble swallowing.    Eyes:  Positive for eye dryness. Negative for pain and discharge.   Respiratory:  Positive for apnea. Negative for cough, choking, chest tightness, shortness of breath and wheezing.    Cardiovascular:  Negative for chest pain, palpitations and leg swelling.   Gastrointestinal:  Positive for abdominal distention, abdominal pain, diarrhea and nausea. Negative for anal bleeding, blood in stool, change in bowel habit, constipation, rectal pain, vomiting, reflux and fecal incontinence.   Genitourinary:  Negative for bladder incontinence, dysuria and hematuria.   Musculoskeletal:  Positive for back pain. Negative for arthralgias and joint swelling.   Integumentary:  Negative for color change and rash.   Allergic/Immunologic: Negative for environmental allergies and food allergies.   Neurological:  Negative for seizures and headaches.   Hematological:  Negative for adenopathy. Does not bruise/bleed easily.        Past Medical History:   Diagnosis Date    Arthritis     Fibromyalgia     History of colonic polyps     History of total hysterectomy     Hyperlipidemia .    Hypertension     Hypothyroid      "Insomnia     Irritable bowel syndrome with diarrhea     Obesity, Class II, BMI 35-39.9, isolated (see actual BMI)     Sleep apnea     12 setting     Past Surgical History:   Procedure Laterality Date    bladder lift      CATARACT EXTRACTION W/  INTRAOCULAR LENS IMPLANT Bilateral     CHOLECYSTECTOMY      COLONOSCOPY      HYSTERECTOMY      rectocele surgery      TOTAL KNEE ARTHROPLASTY Bilateral      Family History   Problem Relation Name Age of Onset    Breast cancer Sister      Breast cancer Sister      Colon cancer Maternal Grandfather      Inflammatory bowel disease Grandchild      Diverticulitis Grandchild      Breast cancer Sister Sister     Crohn's disease Neg Hx      Esophageal cancer Neg Hx      Liver cancer Neg Hx      Rectal cancer Neg Hx      Stomach cancer Neg Hx       Social History[1]  Review of patient's allergies indicates:   Allergen Reactions    Morphine Shortness Of Breath, Palpitations and Other (See Comments)     sweating    Ciprofloxacin hcl Nausea Only      Medication List with Changes/Refills   Current Medications    CLONAZEPAM (KLONOPIN) 1 MG TABLET    Take 1 mg by mouth every evening.    DICYCLOMINE (BENTYL) 10 MG CAPSULE    Take 10 mg by mouth.    DIPHENOXYLATE-ATROPINE 2.5-0.025 MG (LOMOTIL) 2.5-0.025 MG PER TABLET    Take 1 tablet by mouth 4 (four) times daily as needed for Diarrhea.    DULOXETINE (CYMBALTA) 30 MG CAPSULE    Take 30 mg by mouth 2 (two) times daily.    INTRAROSA 6.5 MG INST    Place 1 tablet vaginally twice a week.    NEBIVOLOL (BYSTOLIC) 5 MG TAB    Take 5 mg by mouth once daily.    NP THYROID 120 MG TAB    Take 120 mg by mouth once daily.    TRAZODONE (DESYREL) 100 MG TABLET       Discontinued Medications    HYDROCHLOROTHIAZIDE (HYDRODIURIL) 12.5 MG TAB    Takes as needed         Vital Signs:  BP (!) 153/82 (BP Location: Left arm, Patient Position: Sitting)   Pulse 63   Ht 5' 5.5" (1.664 m)   Wt 104.9 kg (231 lb 3.2 oz)   SpO2 96%   BMI 37.89 kg/m²        Physical " Exam  Vitals reviewed.   Constitutional:       General: She is awake. She is not in acute distress.     Appearance: Normal appearance. She is well-developed. She is obese. She is not ill-appearing, toxic-appearing or diaphoretic.   HENT:      Head: Normocephalic and atraumatic.      Nose: Nose normal.      Mouth/Throat:      Mouth: Mucous membranes are moist.      Pharynx: Oropharynx is clear. No oropharyngeal exudate or posterior oropharyngeal erythema.   Eyes:      General: Lids are normal. Gaze aligned appropriately. No scleral icterus.        Right eye: No discharge.         Left eye: No discharge.      Conjunctiva/sclera: Conjunctivae normal.   Neck:      Trachea: Trachea normal.   Cardiovascular:      Rate and Rhythm: Normal rate and regular rhythm.      Pulses:           Radial pulses are 2+ on the right side and 2+ on the left side.   Pulmonary:      Effort: Pulmonary effort is normal. No respiratory distress.      Breath sounds: No stridor. No wheezing.   Chest:      Chest wall: No tenderness.   Abdominal:      General: Bowel sounds are normal. There is no distension.      Palpations: Abdomen is soft. There is no fluid wave, hepatomegaly or mass.      Tenderness: There is no abdominal tenderness. There is no guarding or rebound.   Musculoskeletal:         General: No tenderness or deformity.      Cervical back: No tenderness.      Right lower leg: No edema.      Left lower leg: No edema.   Lymphadenopathy:      Cervical: No cervical adenopathy.   Skin:     General: Skin is warm and dry.      Capillary Refill: Capillary refill takes less than 2 seconds.      Coloration: Skin is not cyanotic, jaundiced or pale.   Neurological:      General: No focal deficit present.      Mental Status: She is alert and oriented to person, place, and time.      Motor: No tremor.   Psychiatric:         Attention and Perception: Attention normal.         Mood and Affect: Mood and affect normal.         Speech: Speech normal.          Behavior: Behavior normal. Behavior is cooperative.            All of the data above and below has been reviewed by myself and any further interpretations will be reflected in the assessment and plan.   The data includes review of external notes, and independent interpretation of lab results, procedures, x-rays, and imaging reports.      Assessment:  Irritable bowel syndrome with diarrhea    Diarrhea, unspecified type  -     Calprotectin, Stool; Future; Expected date: 04/30/2025  -     Clostridium diff Toxin/GDH w/ reflex PCR; Future; Expected date: 04/30/2025  -     Fecal fat, qualitative; Future; Expected date: 04/30/2025  -     Pancreatic elastase, fecal; Future; Expected date: 04/30/2025  -     CT Enterography Abd_Pelvis With Contrast; Future; Expected date: 04/30/2025  -     H. pylori antigen, stool; Future; Expected date: 04/30/2025    Bloating  -     H. pylori antigen, stool; Future; Expected date: 04/30/2025    Abdominal cramping  -     Calprotectin, Stool; Future; Expected date: 04/30/2025  -     Clostridium diff Toxin/GDH w/ reflex PCR; Future; Expected date: 04/30/2025  -     Fecal fat, qualitative; Future; Expected date: 04/30/2025  -     Pancreatic elastase, fecal; Future; Expected date: 04/30/2025  -     CT Enterography Abd_Pelvis With Contrast; Future; Expected date: 04/30/2025  -     H. pylori antigen, stool; Future; Expected date: 04/30/2025    Duodenitis determined by biopsy    ESR raised  -     Calprotectin, Stool; Future; Expected date: 04/30/2025  -     Clostridium diff Toxin/GDH w/ reflex PCR; Future; Expected date: 04/30/2025  -     Fecal fat, qualitative; Future; Expected date: 04/30/2025  -     Pancreatic elastase, fecal; Future; Expected date: 04/30/2025  -     CT Enterography Abd_Pelvis With Contrast; Future; Expected date: 04/30/2025    History of colon polyps      Chronic diarrhea: likely IBS-D, 2017 EGD w/DBx mild IEL, Celiac serologies neg, gluten-sensitive, CBx nl. Typically  controlled with using Lomotil prn. Diarrhea worsening over last few weeks. Lomotil/Bentyl w/o much improvement. Will do stool tests to rule out infection v infl v EPI. Rec CTE to eval small bowel and rule out inflammation etc. ESR elevated. May consider repeat EGD w/DGBx as well. Xifaxan previously cost prohibitive. If C diff neg, will send Xifaxan course to see if well covered. If not, will try to get samples.     Bloating. Will complete sucrase breath test to r/u CSID  Personal history of colon polyps: due 5/2027 with aggressive prep (Had Sutab last procedure)    Recommendations:    Complete stool tests.   Schedule CT scan.     If any tests, procedures, or imaging has been ordered and you are not contacted to schedule within 1-2 weeks, then you may call the central scheduling department directly at [phone].     Risks, benefits, and alternatives of medical management, any associated procedures, and/or treatment discussed with the patient. Patient given opportunity to ask questions and voices understanding. Patient has elected to proceed with the recommended care modalities as discussed.    Keep follow up.     Order summary:  Orders Placed This Encounter   Procedures    Clostridium diff Toxin/GDH w/ reflex PCR    CT Enterography Abd_Pelvis With Contrast    Calprotectin, Stool    Fecal fat, qualitative    Pancreatic elastase, fecal    H. pylori antigen, stool          Instructed patient to notify my office if they have not been contacted within two weeks after any procedures, submitting any samples (biopsies, blood, stool, urine, etc.) or after any imaging (X-ray, CT, MRI, etc.).      Cate Lockhart NP    This document may have been created using a voice recognition transcribing system. Incorrect words or phrases may have been missed during proofreading. Please interpret accordingly or contact me for clarification.          [1]   Social History  Tobacco Use    Smoking status: Never    Smokeless tobacco:  Never   Substance Use Topics    Alcohol use: Never    Drug use: Yes     Types: Benzodiazepines

## 2025-04-30 NOTE — PATIENT INSTRUCTIONS
Complete stool tests.   Schedule CT scan.     If any tests, procedures, or imaging has been ordered and you are not contacted to schedule within 1-2 weeks, then you may call the central scheduling department directly at [phone].   Please notify my office if you have not been contacted within two weeks after any procedures, submitting any samples (biopsies, blood, stool, urine, etc.) or after any imaging (X-ray, CT, MRI, etc.).

## 2025-05-09 ENCOUNTER — RESULTS FOLLOW-UP (OUTPATIENT)
Dept: GASTROENTEROLOGY | Facility: CLINIC | Age: 75
End: 2025-05-09
Payer: MEDICARE

## 2025-05-09 DIAGNOSIS — K58.0 IRRITABLE BOWEL SYNDROME WITH DIARRHEA: Primary | ICD-10-CM

## 2025-05-09 NOTE — PROGRESS NOTES
CTE 5/7/2025: Fatty liver. No GB. Bilateral parapelvic renal cysts. Small HH. Extensive colonic diverticulosis. No active IBD. TI nl.   MLC

## 2025-05-12 NOTE — TELEPHONE ENCOUNTER
PA for XIFAXAN completed and sent to plan via [facility].   PA APPROVED. Start date: 4/12/2025 thru 05/26/2025.  -LULU,LPN

## 2025-05-13 NOTE — TELEPHONE ENCOUNTER
Called patient and gave results, remarks and recommendations per [facility]. Patient informed of rx of Xifaxan and PA approval and to call us if medication is still too expensive even after PA.  Patient verbalized understanding of this information. -LULU,LPN

## 2025-05-16 NOTE — TELEPHONE ENCOUNTER
Notify patient I received the rest of her stool test results. Inflammation marker was normal and her pancreas seems to be functioning well. Was she able to get Xifaxan from the pharmacy? If cost prohibitive and she is still having diarrhea, then we can give her a few days worth of Xifaxan samples.     5/6/2025: calpro/elastase/fat nl, C diff neg, Hp stool Ag ND.   MLC

## 2025-06-03 ENCOUNTER — TELEPHONE (OUTPATIENT)
Dept: GASTROENTEROLOGY | Facility: CLINIC | Age: 75
End: 2025-06-03

## 2025-06-03 ENCOUNTER — OFFICE VISIT (OUTPATIENT)
Dept: GASTROENTEROLOGY | Facility: CLINIC | Age: 75
End: 2025-06-03
Payer: MEDICARE

## 2025-06-03 VITALS
HEART RATE: 70 BPM | SYSTOLIC BLOOD PRESSURE: 144 MMHG | WEIGHT: 226 LBS | BODY MASS INDEX: 36.32 KG/M2 | DIASTOLIC BLOOD PRESSURE: 87 MMHG | OXYGEN SATURATION: 97 % | HEIGHT: 66 IN

## 2025-06-03 DIAGNOSIS — Z86.0100 HISTORY OF COLON POLYPS: ICD-10-CM

## 2025-06-03 DIAGNOSIS — R14.0 BLOATING: ICD-10-CM

## 2025-06-03 DIAGNOSIS — R30.0 DYSURIA: ICD-10-CM

## 2025-06-03 DIAGNOSIS — K58.0 IRRITABLE BOWEL SYNDROME WITH DIARRHEA: Primary | ICD-10-CM

## 2025-06-03 DIAGNOSIS — R19.7 DIARRHEA, UNSPECIFIED TYPE: ICD-10-CM

## 2025-06-03 PROCEDURE — 1159F MED LIST DOCD IN RCRD: CPT | Mod: CPTII,,, | Performed by: NURSE PRACTITIONER

## 2025-06-03 PROCEDURE — 1160F RVW MEDS BY RX/DR IN RCRD: CPT | Mod: CPTII,,, | Performed by: NURSE PRACTITIONER

## 2025-06-03 PROCEDURE — 3079F DIAST BP 80-89 MM HG: CPT | Mod: CPTII,,, | Performed by: NURSE PRACTITIONER

## 2025-06-03 PROCEDURE — 3077F SYST BP >= 140 MM HG: CPT | Mod: CPTII,,, | Performed by: NURSE PRACTITIONER

## 2025-06-03 PROCEDURE — 3008F BODY MASS INDEX DOCD: CPT | Mod: CPTII,,, | Performed by: NURSE PRACTITIONER

## 2025-06-03 PROCEDURE — 3288F FALL RISK ASSESSMENT DOCD: CPT | Mod: CPTII,,, | Performed by: NURSE PRACTITIONER

## 2025-06-03 PROCEDURE — 99213 OFFICE O/P EST LOW 20 MIN: CPT | Mod: S$PBB,,, | Performed by: NURSE PRACTITIONER

## 2025-06-03 PROCEDURE — 1101F PT FALLS ASSESS-DOCD LE1/YR: CPT | Mod: CPTII,,, | Performed by: NURSE PRACTITIONER

## 2025-06-03 PROCEDURE — 1125F AMNT PAIN NOTED PAIN PRSNT: CPT | Mod: CPTII,,, | Performed by: NURSE PRACTITIONER

## 2025-07-28 ENCOUNTER — TELEPHONE (OUTPATIENT)
Dept: GASTROENTEROLOGY | Facility: CLINIC | Age: 75
End: 2025-07-28
Payer: MEDICARE

## 2025-07-28 DIAGNOSIS — K58.0 IRRITABLE BOWEL SYNDROME WITH DIARRHEA: ICD-10-CM

## 2025-07-28 RX ORDER — DIPHENOXYLATE HYDROCHLORIDE AND ATROPINE SULFATE 2.5; .025 MG/1; MG/1
1 TABLET ORAL 4 TIMES DAILY PRN
Qty: 30 TABLET | Refills: 0 | Status: CANCELLED | OUTPATIENT
Start: 2025-07-28

## 2025-08-11 RX ORDER — DIPHENOXYLATE HYDROCHLORIDE AND ATROPINE SULFATE 2.5; .025 MG/1; MG/1
1 TABLET ORAL 4 TIMES DAILY PRN
Qty: 100 TABLET | Refills: 3 | Status: SHIPPED | OUTPATIENT
Start: 2025-08-11